# Patient Record
Sex: FEMALE | Race: WHITE
[De-identification: names, ages, dates, MRNs, and addresses within clinical notes are randomized per-mention and may not be internally consistent; named-entity substitution may affect disease eponyms.]

---

## 2019-08-03 ENCOUNTER — HOSPITAL ENCOUNTER (EMERGENCY)
Dept: HOSPITAL 72 - EMR | Age: 29
Discharge: HOME | End: 2019-08-03
Payer: COMMERCIAL

## 2019-08-03 VITALS — BODY MASS INDEX: 18.94 KG/M2 | WEIGHT: 125 LBS | HEIGHT: 68 IN

## 2019-08-03 VITALS — DIASTOLIC BLOOD PRESSURE: 122 MMHG | SYSTOLIC BLOOD PRESSURE: 158 MMHG

## 2019-08-03 DIAGNOSIS — N39.0: Primary | ICD-10-CM

## 2019-08-03 DIAGNOSIS — Z87.442: ICD-10-CM

## 2019-08-03 LAB
APPEARANCE UR: (no result)
APTT PPP: YELLOW S
GLUCOSE UR STRIP-MCNC: NEGATIVE MG/DL
KETONES UR QL STRIP: NEGATIVE
LEUKOCYTE ESTERASE UR QL STRIP: (no result)
NITRITE UR QL STRIP: NEGATIVE
PH UR STRIP: 6.5 [PH] (ref 4.5–8)
PROT UR QL STRIP: (no result)
SP GR UR STRIP: 1.01 (ref 1–1.03)
UROBILINOGEN UR-MCNC: NORMAL MG/DL (ref 0–1)

## 2019-08-03 PROCEDURE — 81025 URINE PREGNANCY TEST: CPT

## 2019-08-03 PROCEDURE — 87086 URINE CULTURE/COLONY COUNT: CPT

## 2019-08-03 PROCEDURE — 87181 SC STD AGAR DILUTION PER AGT: CPT

## 2019-08-03 PROCEDURE — 81001 URINALYSIS AUTO W/SCOPE: CPT

## 2019-08-03 PROCEDURE — 99283 EMERGENCY DEPT VISIT LOW MDM: CPT

## 2019-08-03 NOTE — NUR
ED Nurse Note:

Pt came in from home due to burning sensation, frequency, and lower back pain x 
4 days. "Suspects to be UTI". Pt has hx of UTI and has been treated for kidney 
stones roldan.

## 2019-08-03 NOTE — EMERGENCY ROOM REPORT
History of Present Illness


General


Chief Complaint:  Female Urogenital Problems


Source:  Patient





Present Illness


HPI


29-year-old female with history of recurrent urinary tract infections and 

currently having a renal stone in the right kidney that has an appointment for 

lithotripsy in September here complaining of 2 days of urinary frequency and 

dysuria.  Patient denies any suprapubic pain and reports that her right-sided 

flank pain has not gotten worse since the last time she saw her urologist 

couple weeks ago.  Patient denies any hematuria, nausea vomiting, fever and 

chills.  Patient reports that about 2 months ago she developed a UTI and ended 

up having pyelonephritis and accidental discovery of a right renal stone she 

was hospitalized and was given IV hydration as well as IV antibiotics.  Patient 

has been getting recurrent urinary tract infections and according to her 

urologist this is secondary to her renal stone.  Patient has been on multiple 

courses of antibiotics in the past 2 months including Keflex, Bactrim, Cipro, 

and Macrobid.  Patient's last course of antibiotics was Cipro.  Denies chest 

pain, shortness of breath, palpitation, and all other associated symptoms.  

Denies recent sexual activity, vaginal pruritus or ulcers, or discharge.  Her 

last menstrual period was 6  days ago and regular.


Allergies:  


Coded Allergies:  


     No Known Allergies (Unverified , 8/3/19)





Patient History


Past Medical History:  see triage record


Past Surgical History:  unable to obtain


Pertinent Family History:  none


Last Menstrual Period:  7/2019


Pregnant Now:  No


Immunizations:  UTD


Reviewed Nursing Documentation:  PMH: Agreed; PSxH: Agreed





Nursing Documentation-PMH


Past Medical History:  No History, Except For





Review of Systems


All Other Systems:  negative except mentioned in HPI





Physical Exam





Vital Signs








  Date Time  Temp Pulse Resp B/P (MAP) Pulse Ox O2 Delivery O2 Flow Rate FiO2


 


8/3/19 19:06 98.4 101 20 158/122 (134) 99 Room Air  








Sp02 EP Interpretation:  reviewed, normal


General Appearance:  normal inspection, well appearing, no apparent distress, 

alert, non-toxic


Head:  normocephalic, atraumatic


Eyes:  bilateral eye normal inspection, bilateral eye fluoroscene uptake


ENT:  normal ENT inspection, hearing grossly normal


Neck:  normal inspection, full range of motion


Respiratory:  normal inspection, chest non-tender, lungs clear, normal breath 

sounds, no wheezing


Cardiovascular #1:  normal inspection, regular rate, rhythm, no gallop, no JVD, 

no murmur


Gastrointestinal:  normal inspection, non tender, soft, no mass, no bruit


Genitourinary:  no CVA tenderness


Musculoskeletal:  normal inspection, back normal, digits/nails normal, normal 

range of motion


Neurologic:  normal inspection, alert, oriented x3


Psychiatric:  normal inspection, judgement/insight normal


Skin:  no rash


Lymphatic:  normal inspection, no adenopathy





Medical Decision Making


PA Attestation


Diagnosis and treatment plans were reviewed and discussed with my supervising 

physician Dr. Parker


Diagnostic Impression:  


 Primary Impression:  


 UTI (urinary tract infection)


ER Course


29-year-old female with history of recurrent urinary tract infections and 

currently having a renal stone in the right kidney that has an appointment for 

lithotripsy in September here complaining of 2 days of urinary frequency and 

dysuria.  Patient denies any suprapubic pain and reports that her right-sided 

flank pain has not gotten worse since the last time she saw her urologist 

couple weeks ago.  Patient denies any hematuria, nausea vomiting, fever and 

chills.  Patient reports that about 2 months ago she developed a UTI and ended 

up having pyelonephritis and accidental discovery of a right renal stone she 

was hospitalized and was given IV hydration as well as IV antibiotics.  Patient 

has been getting recurrent urinary tract infections and according to her 

urologist this is secondary to her renal stone.  Patient has been on multiple 

courses of antibiotics in the past 2 months including Keflex, Bactrim, Cipro, 

and Macrobid.  Patient's last course of antibiotics was Cipro.  Denies chest 

pain, shortness of breath, palpitation, and all other associated symptoms.  

Denies recent sexual activity, vaginal pruritus or ulcers, or discharge.  Her 

last menstrual period was 6  days ago and regular.





Ddx considered but are not limited to: UTI, pyelonephritis, urinary incontinence

, prolapsed bladder














Vital signs: are WNL, pt. is afebrile








 H&PE are most consistent with: Uncomplicated UTI














ORDERS: UA, urine cx, urine pregnancy test, Keflex














ED INTERVENTIONS: None required at this time.























DISCHARGE: At this time pt. is stable for d/c to home. Will provide printed 

patient care instructions, and any necessary prescriptions. Care plan and 

follow up instructions have been discussed with the patient prior to discharge.

  Patient to follow-up with her urologist if fever and chills nausea vomiting 

return to emergency room





Last Vital Signs








  Date Time  Temp Pulse Resp B/P (MAP) Pulse Ox O2 Delivery O2 Flow Rate FiO2


 


8/3/19 19:24 98.4 101 20 158/122 99 Room Air  








Disposition:  HOME, SELF-CARE


Condition:  Stable


Scripts


Phenazopyridine Hcl* (PYRIDIUM*) 200 Mg Tablet


200 MG ORAL THREE TIMES A DAY for 2 Days, #6 TAB 0 Refills


   Prov: Darshan Marroquin         8/3/19 


Cephalexin* (KEFLEX*) 500 Mg Capsule


500 MG ORAL EVERY 6 HOURS for 7 Days, #28 CAP


   Prov: Darshan Marrouqin         8/3/19


Patient Instructions:  Urinary Tract Infection





Additional Instructions:  


Take medication as directed follow-up with your urologist regarding your renal 

stone as well as your recurrent UTI increase your oral hydration if worsening 

symptoms, fever and chills, nausea vomiting return to the emergency room











Darshan Marroquin Aug 3, 2019 19:46

## 2021-03-27 ENCOUNTER — HOSPITAL ENCOUNTER (EMERGENCY)
Dept: HOSPITAL 72 - EMR | Age: 31
Discharge: HOME | End: 2021-03-27
Payer: COMMERCIAL

## 2021-03-27 VITALS — SYSTOLIC BLOOD PRESSURE: 157 MMHG | DIASTOLIC BLOOD PRESSURE: 104 MMHG

## 2021-03-27 VITALS — DIASTOLIC BLOOD PRESSURE: 92 MMHG | SYSTOLIC BLOOD PRESSURE: 125 MMHG

## 2021-03-27 VITALS — BODY MASS INDEX: 19.7 KG/M2 | WEIGHT: 130 LBS | HEIGHT: 68 IN

## 2021-03-27 DIAGNOSIS — I10: Primary | ICD-10-CM

## 2021-03-27 LAB
ADD MANUAL DIFF: NO
ANION GAP SERPL CALC-SCNC: 10 MMOL/L (ref 5–15)
APPEARANCE UR: CLEAR
APTT PPP: (no result) S
BASOPHILS NFR BLD AUTO: 1.1 % (ref 0–2)
BUN SERPL-MCNC: 9 MG/DL (ref 7–18)
CALCIUM SERPL-MCNC: 9.7 MG/DL (ref 8.5–10.1)
CHLORIDE SERPL-SCNC: 101 MMOL/L (ref 98–107)
CO2 SERPL-SCNC: 27 MMOL/L (ref 21–32)
CREAT SERPL-MCNC: 0.8 MG/DL (ref 0.55–1.3)
EOSINOPHIL NFR BLD AUTO: 1.1 % (ref 0–3)
ERYTHROCYTE [DISTWIDTH] IN BLOOD BY AUTOMATED COUNT: 12.6 % (ref 11.6–14.8)
GLUCOSE UR STRIP-MCNC: NEGATIVE MG/DL
HCT VFR BLD CALC: 46.7 % (ref 37–47)
HGB BLD-MCNC: 15.1 G/DL (ref 12–16)
KETONES UR QL STRIP: NEGATIVE
LEUKOCYTE ESTERASE UR QL STRIP: (no result)
LYMPHOCYTES NFR BLD AUTO: 42.5 % (ref 20–45)
MCV RBC AUTO: 95 FL (ref 80–99)
MONOCYTES NFR BLD AUTO: 10.1 % (ref 1–10)
NEUTROPHILS NFR BLD AUTO: 45.2 % (ref 45–75)
NITRITE UR QL STRIP: NEGATIVE
PH UR STRIP: 6.5 [PH] (ref 4.5–8)
PLATELET # BLD: 333 K/UL (ref 150–450)
POTASSIUM SERPL-SCNC: 4 MMOL/L (ref 3.5–5.1)
PROT UR QL STRIP: NEGATIVE
RBC # BLD AUTO: 4.92 M/UL (ref 4.2–5.4)
SODIUM SERPL-SCNC: 138 MMOL/L (ref 136–145)
SP GR UR STRIP: 1.01 (ref 1–1.03)
UROBILINOGEN UR-MCNC: NORMAL MG/DL (ref 0–1)
WBC # BLD AUTO: 6.4 K/UL (ref 4.8–10.8)

## 2021-03-27 PROCEDURE — 36415 COLL VENOUS BLD VENIPUNCTURE: CPT

## 2021-03-27 PROCEDURE — 85025 COMPLETE CBC W/AUTO DIFF WBC: CPT

## 2021-03-27 PROCEDURE — 80048 BASIC METABOLIC PNL TOTAL CA: CPT

## 2021-03-27 PROCEDURE — 99284 EMERGENCY DEPT VISIT MOD MDM: CPT

## 2021-03-27 PROCEDURE — 81001 URINALYSIS AUTO W/SCOPE: CPT

## 2021-03-27 NOTE — EMERGENCY ROOM REPORT
History of Present Illness


General


Chief Complaint:  Hypertension


Source:  Patient





Present Illness


HPI


This is a 30-year-old female with a history of anxiety and hypertension.  She 

takes propanolol for it.  She presents with chief complaint of high blood 

pressure.  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 

under more stress and her blood pressure going up higher.  Tonight it was systo

lic 170/125.  She took extra 80 mg of propanolol.  Denies any suicidal thoughts 

homicidal thought.  Other than stress, she denies any drugs or alcohol use.  She

has no chest pain or shortness of breath.  No urinary complaint.


Allergies:  


Coded Allergies:  


     No Known Allergies (Unverified , 8/3/19)





COVID-19 Screening


Contact w/high risk pt:  No


Experienced COVID-19 symptoms?:  No


COVID-19 Testing performed PTA:  No





Patient History


Past Medical History:  see triage record, old chart reviewed, HTN, psych hx


Past Surgical History:  none


Pertinent Family History:  none


Social History:  Denies: smoking


Pregnant Now:  No


Immunizations:  other


Reviewed Nursing Documentation:  PMH: Agreed; PSxH: Agreed





Review of Systems


Eye:  Denies: eye pain, blurred vision


ENT:  Denies: ear pain, nose congestion, throat swelling


Respiratory:  Denies: cough, shortness of breath


Cardiovascular:  Denies: chest pain, palpitations


Gastrointestinal:  Denies: abdominal pain, diarrhea, nausea, vomiting


Musculoskeletal:  Denies: back pain, joint pain


Skin:  Denies: rash


Neurological:  Denies: headache, numbness


Endocrine:  Denies: increased thirst, increased urine


Hematologic/Lymphatic:  Denies: easy bruising


All Other Systems:  negative except mentioned in HPI





Physical Exam





Vital Signs








  Date Time  Temp Pulse Resp B/P (MAP) Pulse Ox O2 Delivery O2 Flow Rate FiO2


 


3/27/21 00:09 98.2 73 18 157/104 (121) 99   





Vitals with hypertension


Sp02 EP Interpretation:  reviewed, normal


General Appearance:  well appearing, no apparent distress, alert


Head:  normocephalic, atraumatic


Eyes:  bilateral eye PERRL, bilateral eye EOMI


ENT:  hearing grossly normal, normal pharynx


Neck:  full range of motion, supple, no meningismus


Respiratory:  chest non-tender, lungs clear, normal breath sounds


Cardiovascular #1:  regular rate, rhythm, no murmur


Gastrointestinal:  normal bowel sounds, non tender, no mass, no organomegaly, no

bruit, non-distended


Musculoskeletal:  back normal, normal range of motion, gait/station normal


Psychiatric:  mood/affect normal





Medical Decision Making


Diagnostic Impression:  


   Primary Impression:  


   Hypertension


   Qualified Codes:  I10 - Essential (primary) hypertension


ER Course


Patient presents with hypertension.  There is no evidence of endorgan damage.  

Pressure is better now.  It is 126/87.  Will discharge home.





Last Vital Signs








  Date Time  Temp Pulse Resp B/P (MAP) Pulse Ox O2 Delivery O2 Flow Rate FiO2


 


3/27/21 00:18  73 18     


 


3/27/21 00:18 98.2   157/104 99   








Status:  improved


Disposition:  HOME, SELF-CARE


Condition:  Stable


Scripts


Hydrochlorothiazide* (HYDROCHLOROTHIAZIDE*) 25 Mg Tablet


25 MG ORAL DAILY for Hypertension, #90 TAB


   Prov: Danish Burris MD         3/27/21


Referrals:  


NON PHYSICIAN (PCP)





Additional Instructions:  


Follow-up with your doctor in 1 to 2 weeks.  Return if symptoms worsen.











Danish Burris MD                  Mar 27, 2021 00:26

## 2021-03-27 NOTE — NUR
ED Nurse Note:

Patient walked into ED c/o hypertension, patient reports of having a systolic 
in the 170s prior to arrival. reports of dizziness and a frontal headache that 
she rates a 6/10 pain. patient does have a history of hypertension and takes 
propranalol. patient placed on gurney. will wait for further orders